# Patient Record
Sex: MALE | Race: WHITE
[De-identification: names, ages, dates, MRNs, and addresses within clinical notes are randomized per-mention and may not be internally consistent; named-entity substitution may affect disease eponyms.]

---

## 2017-04-30 ENCOUNTER — HOSPITAL ENCOUNTER (EMERGENCY)
Dept: HOSPITAL 74 - JERFT | Age: 27
Discharge: HOME | End: 2017-04-30
Payer: COMMERCIAL

## 2017-04-30 VITALS — BODY MASS INDEX: 31.4 KG/M2

## 2017-04-30 VITALS — DIASTOLIC BLOOD PRESSURE: 68 MMHG | SYSTOLIC BLOOD PRESSURE: 133 MMHG | HEART RATE: 80 BPM | TEMPERATURE: 98.4 F

## 2017-04-30 DIAGNOSIS — S61.230A: Primary | ICD-10-CM
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
## 2017-04-30 LAB
HIV 1 & 2 AB: NEGATIVE
HIV 1 AGP24: NEGATIVE

## 2017-04-30 NOTE — PDOC
Post Exposure HPI





- General


Chief Complaint: Non EmpBld/Body Flud Exposure


Stated Complaint: STUCK BY NEEDLE





- History of Present Illness


Initial Comments: 


04/30/17 14:13


CHIEF COMPLAINT:





HISTORY OF PRESENT ILLNESS: 26 yo M with no PMH presents to ED s/p getting 

stuck by a needle in his car approximately 1 hour ago .  Patient states he was 

cleaning out a car that he bought 3 months ago and when he reached under a seat 

he felt a prick and saw that it was a used insulin needle. He denies any fever, 

nausea, vomiting, diarrhea, or other symptoms.  Patient received Tdap in this 

ER 3 years ago. 





No recent travel or sick contacts. 





PAST MEDICAL HISTORY: Denies past medical history





FAMILY HISTORY: Denies





SOCIAL HISTORY:  Denies tobacco, alcohol, illicit drug use. 





SURGICAL HISTORY: Denies





ALLERGIES: No known drug allergies





REVIEW OF SYSTEMS


General/Constitutional: Denies fever or chills. Denies weakness, weight change.





HEENT: Denies change in vision. Denies ear pain or discharge. Denies sore 

throat.





Cardiovascular: Denies chest pain or shortness of breath.





Respiratory: Denies cough, wheezing, or hemoptysis.





Gastrointestinal: Denies nausea, vomiting, diarrhea or constipation. Denies 

rectal bleeding.





Genitourinary: Denies dysuria, frequency, or change in urination.





Musculoskeletal: Denies joint or muscle swelling or pain. Denies neck or back 

pain.





Skin and breasts: "I had a small prick on my finger, it bled like a drop but it'

s fine now." Denies rash or easy bruising.





Neurologic: Denies headache, vertigo, loss of consciousness, or loss of 

sensation.





PHYSICAL EXAM


General Appearance: Well-appearing, appropriately dressed.  No apparent 

distress.





HEENT: EOMI, PERRLA, normal ENT inspection, normal voice, TMs normal, pharynx 

normal.  No conjunctival pallor.  No photophobia, scleral icterus.





Neck: Supple.  Trachea midline. No tenderness, rigidity, carotid bruit, stridor

, lymphadenopathy, or thyromegaly. 





Respiratory/Chest: Lungs CTAB.  





Cardiovascular: RRR. S1, S2. 





Gastrointestinal/Abdominal: Normal bowel sounds.  Abdomen soft, non-distended.  

No tenderness or rebound tenderness. No  organomegaly, pulsatile mass, guarding

, hernia, hepatomegaly, splenomegaly.





Musculoskeletal/Extremities:  Normal inspection. FROM of all extremities, 

normal capillary refill.  Pelvis Stable.  No CVA tenderness. No tenderness to 

extremities, pedal edema, swelling, erythema or deformity.





Integumentary: Appropriate color, dry, warm.  No cyanosis, erythema, jaundice 

or rash





Neurologic: CNs II-XII intact. Fully oriented, alert.  Appropriate mood/affect. 

Motor strength 5/5.  No appreciable EOM palsy, facial droop or sensory deficit.





04/30/17 14:35








04/30/17 14:36








Past History





- Past Medical History


Allergies/Adverse Reactions: 


Allergies





No Known Allergies Allergy (Verified 04/30/17 13:25)


 








Home Medications: 


Ambulatory Orders





NK [No Known Home Medication]  04/30/17 











- Immunization History


Tetanus Status: Unknown





- Social History


Smoking Status: Never smoked


Number of Ciarettes Per Day: 2





*Physical Exam





- Vital Signs


 Last Vital Signs











Temp Pulse Resp BP Pulse Ox


 


 98.4 F   80   18   133/68   100 


 


 04/30/17 13:26  04/30/17 13:26  04/30/17 13:26  04/30/17 13:26  04/30/17 13:26














Medical Decision Making





- Medical Decision Making


04/30/17 14:36


26 yo M with no PMH presents to ED s/p getting stuck by a needle in his car 

approximately 1 hour ago .  





PAtient is UTD with tetanus





Patient states he does not want to start any prophylactic medication but would 

like to get tested for HIV and Hep C. 





Advised patient we will call with any positive results.  Advised patient that 

seroconversion does not happen immediately and follow up testing in 6-8 weeks 

is recommended.  Patient verbalized understanding and states he will f/u with 

his PMD in 6-8 weeks for further testing. 





04/30/17 14:39








*DC/Admit/Observation/Transfer


Diagnosis at time of Disposition: 


 Needle stick injury of finger





- Discharge Dispostion


Disposition: HOME


Condition at time of disposition: Stable


Admit: No





- Referrals


Referrals: 


Yousif Segundo MD [Primary Care Provider] - 





- Patient Instructions


Printed Discharge Instructions:  How to Handle Body Fluid Exposure -- Non-

Healthcare Worker (At Home, Caregi


Additional Instructions: 


As discussed, the risk of infection from a needlestick from a needle that has 

been in your car for 3 months is very low, but there is still a risk.  It is 

advised that you follow up with your primary care doctor for repeat testing.  

If you experience any fever, nausea, vomiting, diarrhea, sore throat, fatigue, 

or any new or worsening symptoms, please return to the ER.

## 2020-01-29 ENCOUNTER — HOSPITAL ENCOUNTER (EMERGENCY)
Dept: HOSPITAL 74 - JER | Age: 30
LOS: 1 days | Discharge: HOME | End: 2020-01-30
Payer: COMMERCIAL

## 2020-01-29 VITALS — SYSTOLIC BLOOD PRESSURE: 124 MMHG | HEART RATE: 110 BPM | DIASTOLIC BLOOD PRESSURE: 74 MMHG

## 2020-01-29 VITALS — BODY MASS INDEX: 25.2 KG/M2

## 2020-01-29 DIAGNOSIS — B97.89: ICD-10-CM

## 2020-01-29 DIAGNOSIS — J45.909: ICD-10-CM

## 2020-01-29 DIAGNOSIS — J06.9: Primary | ICD-10-CM

## 2020-01-29 PROCEDURE — 3E033NZ INTRODUCTION OF ANALGESICS, HYPNOTICS, SEDATIVES INTO PERIPHERAL VEIN, PERCUTANEOUS APPROACH: ICD-10-PCS

## 2020-01-29 NOTE — PDOC
Attending Attestation





- Resident


Resident Name: AkikoSylvie





- ED Attending Attestation


I have performed the following: I have examined & evaluated the patient, The 

case was reviewed & discussed with the resident, I agree w/resident's findings 

& plan





- HPI


HPI: 





01/30/20 00:05


see resident hpi





- Physicial Exam


PE: 





01/30/20 00:06


see resident exam





- Medical Decision Making





01/30/20 00:07


29-year-old male with body aches and cough as well as fevers at home


Plan for basic labs, IV fluids and analgesics due to clinical dehydration on 

exam


Plan for DC home pending results and reevaluation

## 2020-01-30 VITALS — TEMPERATURE: 99.3 F

## 2020-01-30 LAB
ALBUMIN SERPL-MCNC: 4.2 G/DL (ref 3.4–5)
ALP SERPL-CCNC: 60 U/L (ref 45–117)
ALT SERPL-CCNC: 50 U/L (ref 13–61)
ANION GAP SERPL CALC-SCNC: 10 MMOL/L (ref 8–16)
AST SERPL-CCNC: 37 U/L (ref 15–37)
BASOPHILS # BLD: 0.2 % (ref 0–2)
BILIRUB SERPL-MCNC: 0.5 MG/DL (ref 0.2–1)
BUN SERPL-MCNC: 9 MG/DL (ref 7–18)
CALCIUM SERPL-MCNC: 8.6 MG/DL (ref 8.5–10.1)
CHLORIDE SERPL-SCNC: 103 MMOL/L (ref 98–107)
CO2 SERPL-SCNC: 24 MMOL/L (ref 21–32)
CREAT SERPL-MCNC: 0.8 MG/DL (ref 0.55–1.3)
DEPRECATED RDW RBC AUTO: 14.2 % (ref 11.9–15.9)
EOSINOPHIL # BLD: 0.1 % (ref 0–4.5)
GLUCOSE SERPL-MCNC: 137 MG/DL (ref 74–106)
HCT VFR BLD CALC: 43.9 % (ref 35.4–49)
HGB BLD-MCNC: 15 GM/DL (ref 11.7–16.9)
LYMPHOCYTES # BLD: 5.8 % (ref 8–40)
MCH RBC QN AUTO: 29.9 PG (ref 25.7–33.7)
MCHC RBC AUTO-ENTMCNC: 34.1 G/DL (ref 32–35.9)
MCV RBC: 87.9 FL (ref 80–96)
MONOCYTES # BLD AUTO: 6.8 % (ref 3.8–10.2)
NEUTROPHILS # BLD: 87.1 % (ref 42.8–82.8)
PLATELET # BLD AUTO: 178 K/MM3 (ref 134–434)
PMV BLD: 8.4 FL (ref 7.5–11.1)
POTASSIUM SERPLBLD-SCNC: 3.7 MMOL/L (ref 3.5–5.1)
PROT SERPL-MCNC: 7.1 G/DL (ref 6.4–8.2)
RBC # BLD AUTO: 5 M/MM3 (ref 4–5.6)
SODIUM SERPL-SCNC: 137 MMOL/L (ref 136–145)
WBC # BLD AUTO: 8.6 K/MM3 (ref 4–10)

## 2020-02-24 ENCOUNTER — HOSPITAL ENCOUNTER (EMERGENCY)
Dept: HOSPITAL 74 - JER | Age: 30
Discharge: HOME | End: 2020-02-24
Payer: COMMERCIAL

## 2020-02-24 VITALS — BODY MASS INDEX: 21.3 KG/M2

## 2020-02-24 VITALS — SYSTOLIC BLOOD PRESSURE: 127 MMHG | HEART RATE: 74 BPM | DIASTOLIC BLOOD PRESSURE: 65 MMHG | TEMPERATURE: 98.1 F

## 2020-02-24 DIAGNOSIS — R07.9: Primary | ICD-10-CM

## 2020-02-24 LAB
ALBUMIN SERPL-MCNC: 4 G/DL (ref 3.4–5)
ALP SERPL-CCNC: 68 U/L (ref 45–117)
ALT SERPL-CCNC: 24 U/L (ref 13–61)
ANION GAP SERPL CALC-SCNC: 2 MMOL/L (ref 8–16)
APTT BLD: 31.4 SECONDS (ref 25.2–36.5)
AST SERPL-CCNC: 13 U/L (ref 15–37)
BASOPHILS # BLD: 0.6 % (ref 0–2)
BILIRUB SERPL-MCNC: 0.9 MG/DL (ref 0.2–1)
BUN SERPL-MCNC: 11 MG/DL (ref 7–18)
CALCIUM SERPL-MCNC: 9.4 MG/DL (ref 8.5–10.1)
CHLORIDE SERPL-SCNC: 106 MMOL/L (ref 98–107)
CO2 SERPL-SCNC: 29 MMOL/L (ref 21–32)
CREAT SERPL-MCNC: 0.7 MG/DL (ref 0.55–1.3)
DEPRECATED RDW RBC AUTO: 14.6 % (ref 11.9–15.9)
EOSINOPHIL # BLD: 1.2 % (ref 0–4.5)
GLUCOSE SERPL-MCNC: 91 MG/DL (ref 74–106)
HCT VFR BLD CALC: 43 % (ref 35.4–49)
HGB BLD-MCNC: 14.4 GM/DL (ref 11.7–16.9)
INR BLD: 1.13 (ref 0.83–1.09)
LYMPHOCYTES # BLD: 25.8 % (ref 8–40)
MAGNESIUM SERPL-MCNC: 2.3 MG/DL (ref 1.8–2.4)
MCH RBC QN AUTO: 30.1 PG (ref 25.7–33.7)
MCHC RBC AUTO-ENTMCNC: 33.5 G/DL (ref 32–35.9)
MCV RBC: 89.7 FL (ref 80–96)
MONOCYTES # BLD AUTO: 7.7 % (ref 3.8–10.2)
NEUTROPHILS # BLD: 64.7 % (ref 42.8–82.8)
PLATELET # BLD AUTO: 241 K/MM3 (ref 134–434)
PMV BLD: 8.2 FL (ref 7.5–11.1)
POTASSIUM SERPLBLD-SCNC: 4.6 MMOL/L (ref 3.5–5.1)
PROT SERPL-MCNC: 7.4 G/DL (ref 6.4–8.2)
PT PNL PPP: 13.4 SEC (ref 9.7–13)
RBC # BLD AUTO: 4.8 M/MM3 (ref 4–5.6)
SODIUM SERPL-SCNC: 137 MMOL/L (ref 136–145)
WBC # BLD AUTO: 6.4 K/MM3 (ref 4–10)

## 2020-02-24 PROCEDURE — 3E0233Z INTRODUCTION OF ANTI-INFLAMMATORY INTO MUSCLE, PERCUTANEOUS APPROACH: ICD-10-PCS | Performed by: EMERGENCY MEDICINE

## 2020-02-24 NOTE — EKG
Test Reason : 

Blood Pressure : ***/*** mmHG

Vent. Rate : 070 BPM     Atrial Rate : 070 BPM

   P-R Int : 146 ms          QRS Dur : 084 ms

    QT Int : 380 ms       P-R-T Axes : 069 083 059 degrees

   QTc Int : 410 ms

 

NORMAL SINUS RHYTHM

NORMAL ECG

NO PREVIOUS ECGS AVAILABLE

Confirmed by Koby Rodriguez ([id number]) on 2/24/2020 9:10:38 AM

 

Referred By:             Confirmed By:Koby Rodriguez

## 2020-02-24 NOTE — PDOC
History of Present Illness





- General


History Source: Patient


Exam Limitations: No Limitations





- History of Present Illness


Initial Comments: 





02/24/20 07:39


Patient is a 30-year-old male with no past medical history who presents to the 

ED with complaint of sharp midsternal chest pain that is reproducible for the 

last 5 days.  He states that the pain was initially in the anterior axillary 

line and it was intermittent.  Last night the patient took aspirin and went to 

sleep.  He states the aspirin helped his pain.  He woke up this morning and the 

pain was midsternal and constant.  He became concerned and came to the ED for 

evaluation.  He denies any recent long travel in a car or plane.  He denies any 

history of clotting disorders.  The patient is a smoker.











<Alexandra Beauchamp - Last Filed: 02/24/20 10:04>





<Dom Cullen - Last Filed: 02/24/20 14:53>





- General


Chief Complaint: Chest Pain


Stated Complaint: CHEST PAIN


Time Seen by Provider: 02/24/20 07:21





Past History





- Past Medical History


Asthma: Yes


COPD: No





- Psycho Social/Smoking Cessation Hx


Smoking History: Current every day smoker


Have you smoked in the past 12 months: Yes


Number of Cigarettes Smoked Daily: 12


Information on smoking cessation initiated: Yes


Hx Alcohol Use: No


Drug/Substance Use Hx: No


Substance Use Type: Alcohol





<Alexandra Beauchamp - Last Filed: 02/24/20 10:04>





<Dom Cullen - Last Filed: 02/24/20 14:53>





- Past Medical History


Allergies/Adverse Reactions: 


 Allergies











Allergy/AdvReac Type Severity Reaction Status Date / Time


 


No Known Allergies Allergy   Verified 04/30/17 13:25











Home Medications: 


Ambulatory Orders





Ibuprofen [Motrin -] 600 mg PO TID PRN #21 tablet 02/24/20 











**Review of Systems





- Review of Systems


Comments:: 





02/24/20 07:40


- Review of Systems


Able to Perform ROS?: Yes


Constitutional: No: Fever, Chills, Loss of Appetite, Night Sweats, Weakness


HEENTM: No: Eye Pain, Vision changes, Ear Pain, Throat Pain, Throat Swelling, 

Mouth Pain, Difficulty Swallowing


Respiratory: No: Cough, Shortness of Breath, Wheezing, Sputum Production


Cardiac (ROS): No: Chest Tightness, Palpitations, Irregular Heart Beat, Edema; 

positive: Midsternal chest pain


ABD/GI: No: Nausea, Vomiting, Abdominal Pain, Diarrhea


: No Dysuria, No Hematuria, No Frequency, No Urgency


Musculoskeletal: No: Muscle Pain, Back Pain, Joint Pain, Muscle Weakness, Neck 

Pain


Integumentary: No: Lesions, Rash


Neurological: No: Headache, Numbness, Tingling, Weakness, Speech Difficulties





<Alexandra Beauchamp D - Last Filed: 02/24/20 10:04>





*Physical Exam





- Vital Signs


 Last Vital Signs











Temp Pulse Resp BP Pulse Ox


 


 98.1 F   74   16   127/65   97 


 


 02/24/20 07:18  02/24/20 07:18  02/24/20 07:18  02/24/20 07:18  02/24/20 07:18














- Physical Exam





02/24/20 07:41


- Physical Exam


General Appearance:  Nourished, Appropriately Dressed, No Distress


HEENT: EOMI, Normal Voice, No Muffled/Hoarse voice, No Nasal Congestion, No 

Rhinorrhea, Hearing Grossly Normal, moist mucosa


Neck: Supple, No Lymphadenopathy (R), No Lymphadenopathy (L), No Rigidity, No 

Decreased range of motion


Respiratory/Chest: Lungs Clear, Normal Breath Sounds.  No Respiratory Distress, 

No Accessory Muscle Use


Cardiovascular: Regular Rhythm, Regular Rate, S1, S2, reproducible left 

midsternal chest pain to palpation.  No flail chest.


Gastrointestinal/Abdominal: Normal Bowel Sounds, Soft.  Non-tender, No Guarding

, No Rebound, No Rigidity


Musculoskeletal: Normal Inspection.  No Decreased Range of Motion


Extremity: Normal Capillary Refill, Normal Inspection


Integumentary:  Normal Color, Dry.  No Rash


Neurologic: CNs II-XII NML intact, Fully Oriented, Alert, Normal Mood/Affect, 

Normal Response





<Kennedy Beauchampica D - Last Filed: 02/24/20 10:04>





- Vital Signs


 Last Vital Signs











Temp Pulse Resp BP Pulse Ox


 


 98.1 F   74   16   127/65   97 


 


 02/24/20 07:18  02/24/20 07:18  02/24/20 07:18  02/24/20 07:18  02/24/20 07:18














<Dom Cullen - Last Filed: 02/24/20 14:53>





**Heart Score/ECG Review





- History


History: Slightly suspicious





- Electrocardiogram


EKG: Normal





- Age


Age: </= 45





- Risk Factors


Risk Factors Heart Score: Yes Smoking History


Based on the list above the patient has:: 1-2 risk factors





- ECG Intrepretation


Rhythm: Regular Rhythm





- Axis


Axis: Normal





- ECG Impressions


Normal ECG: Yes


Comment:: 





02/24/20 07:43


Normal sinus rhythm at 70 bpm





<Alexandra Beauchamp D - Last Filed: 02/24/20 10:04>





ED Treatment Course





- LABORATORY


CBC & Chemistry Diagram: 


 02/24/20 08:56





 02/24/20 08:56





- RADIOLOGY


Radiology Studies Ordered: 














 Category Date Time Status


 


 CHEST PA & LAT [RAD] Stat Radiology  02/24/20 07:30 Ordered














<Quynh,Alexandra D - Last Filed: 02/24/20 10:04>





- LABORATORY


CBC & Chemistry Diagram: 


 02/24/20 08:56





 02/24/20 08:56





- ADDITIONAL ORDERS


Additional order review: 


 Laboratory  Results











  02/24/20 02/24/20





  08:56 08:56


 


PT with INR   13.40 H


 


INR   1.13 H


 


PTT (Actin FS)   31.4


 


Sodium  137 


 


Potassium  4.6 


 


Chloride  106 


 


Carbon Dioxide  29 


 


Anion Gap  2 L 


 


BUN  11.0 


 


Creatinine  0.7 


 


Est GFR (CKD-EPI)AfAm  144.72 


 


Est GFR (CKD-EPI)NonAf  124.87 


 


Random Glucose  91 


 


Calcium  9.4 


 


Magnesium  2.3 


 


Total Bilirubin  0.9 


 


AST  13 L 


 


ALT  24 


 


Alkaline Phosphatase  68 


 


Creatine Kinase  75 


 


Troponin I  < 0.02 


 


Total Protein  7.4 


 


Albumin  4.0 








 











  02/24/20





  08:56


 


RBC  4.80


 


MCV  89.7


 


MCHC  33.5


 


RDW  14.6


 


MPV  8.2


 


Neutrophils %  64.7  D


 


Lymphocytes %  25.8  D


 


Monocytes %  7.7


 


Eosinophils %  1.2  D


 


Basophils %  0.6














- Medications


Given in the ED: 


ED Medications














Discontinued Medications














Generic Name Dose Route Start Last Admin





  Trade Name Freq  PRN Reason Stop Dose Admin


 


Ketorolac Tromethamine  15 mg  02/24/20 09:50  02/24/20 10:26





  Toradol Injection -  IM  02/24/20 09:51  15 mg





  ONCE ONE   Administration





     





     





     





     














<Dom Cullen - Last Filed: 02/24/20 14:53>





Medical Decision Making





- Medical Decision Making





02/24/20 07:43


Assessment: Patient is a 30-year-old male with sharp midsternal chest pain for 

5 days.





Plan:


-EKG done in triage


-Chest x-ray ordered


-Labs ordered


-Will reassess


02/24/20 10:04


The patient has been made aware that his EKG and labs are all within normal 

limits.  His chest pain can be secondary to chest wall pain or costochondritis.

  The patient has been advised to follow-up with his primary doctor within 1 to 

2 days for repeat evaluation.  He understands and agrees with this treatment 

plan and the patient stable for discharge.





<Alexandra Beauchamp - Last Filed: 02/24/20 10:04>





- Medical Decision Making








02/24/20 14:52





I reviewed the case of the  mid-level practitioner and was available for 

consultation while in the emergency department 








<Dom Cullen - Last Filed: 02/24/20 14:53>





Discharge





- Discharge Information


Problems reviewed: Yes





<Alexandra Beauchamp - Last Filed: 02/24/20 10:04>





<Dom Cullen - Last Filed: 02/24/20 14:53>





- Discharge Information


Clinical Impression/Diagnosis: 


 Atypical chest pain





Condition: Stable


Disposition: HOME





- Additional Discharge Information


Prescriptions: 


Ibuprofen [Motrin -] 600 mg PO TID PRN #21 tablet


 PRN Reason: Pain





- Follow up/Referral


Referrals: 


Micky Almaraz MD [Staff Physician] - 3 days





- Patient Discharge Instructions


Patient Printed Discharge Instructions:  DI for Atypical Chest Pain


Additional Instructions: 


Get plenty of rest and drink plenty of fluids.  Take the ibuprofen as needed 

for chest wall pain.  Be sure to follow-up with your primary doctor within 1 to 

2 days for repeat evaluation.  You may require further cardiac evaluation as an 

outpatient.  A referral to a cardiologist has been given to you.





- Post Discharge Activity


Work/Back to School Note:  Back to Work

## 2023-05-01 ENCOUNTER — OFFICE (OUTPATIENT)
Dept: URBAN - METROPOLITAN AREA CLINIC 29 | Facility: CLINIC | Age: 33
Setting detail: OPHTHALMOLOGY
End: 2023-05-01
Payer: COMMERCIAL

## 2023-05-01 DIAGNOSIS — H35.711: ICD-10-CM

## 2023-05-01 PROCEDURE — 92004 COMPRE OPH EXAM NEW PT 1/>: CPT | Performed by: OPTOMETRIST

## 2023-05-01 PROCEDURE — 92134 CPTRZ OPH DX IMG PST SGM RTA: CPT | Performed by: OPTOMETRIST

## 2023-05-01 ASSESSMENT — CONFRONTATIONAL VISUAL FIELD TEST (CVF)
OS_FINDINGS: FULL
OD_FINDINGS: FULL

## 2023-05-01 ASSESSMENT — REFRACTION_MANIFEST
OD_AXIS: 040
OD_VA1: 20/60-2
OS_SPHERE: -0.75
OD_CYLINDER: +0.50
OS_AXIS: 155
OS_VA1: 20/20
OD_SPHERE: -0.25
OS_CYLINDER: +0.75

## 2023-05-01 ASSESSMENT — REFRACTION_AUTOREFRACTION
OS_CYLINDER: +1.00
OD_AXIS: 40
OS_SPHERE: -0.75
OS_AXIS: 155
OD_CYLINDER: +0.75
OD_SPHERE: +0.25

## 2023-05-01 ASSESSMENT — SPHEQUIV_DERIVED
OS_SPHEQUIV: -0.25
OD_SPHEQUIV: 0
OD_SPHEQUIV: 0.625
OS_SPHEQUIV: -0.375

## 2023-05-01 ASSESSMENT — VISUAL ACUITY
OD_BCVA: 20/30+2
OS_BCVA: 20/70-2

## 2023-05-22 ENCOUNTER — OFFICE (OUTPATIENT)
Dept: URBAN - METROPOLITAN AREA CLINIC 29 | Facility: CLINIC | Age: 33
Setting detail: OPHTHALMOLOGY
End: 2023-05-22
Payer: COMMERCIAL

## 2023-05-22 DIAGNOSIS — H53.19: ICD-10-CM

## 2023-05-22 DIAGNOSIS — H35.711: ICD-10-CM

## 2023-05-22 PROCEDURE — 92250 FUNDUS PHOTOGRAPHY W/I&R: CPT | Performed by: OPHTHALMOLOGY

## 2023-05-22 PROCEDURE — 92014 COMPRE OPH EXAM EST PT 1/>: CPT | Performed by: OPHTHALMOLOGY

## 2023-05-22 ASSESSMENT — SPHEQUIV_DERIVED
OS_SPHEQUIV: -0.375
OD_SPHEQUIV: 0
OD_SPHEQUIV: 0.625
OS_SPHEQUIV: -0.25

## 2023-05-22 ASSESSMENT — REFRACTION_MANIFEST
OS_AXIS: 155
OS_CYLINDER: +0.75
OD_SPHERE: -0.25
OD_AXIS: 040
OD_VA1: 20/60-2
OS_VA1: 20/20
OS_SPHERE: -0.75
OD_CYLINDER: +0.50

## 2023-05-22 ASSESSMENT — REFRACTION_AUTOREFRACTION
OS_AXIS: 155
OD_CYLINDER: +0.75
OD_AXIS: 40
OS_SPHERE: -0.75
OS_CYLINDER: +1.00
OD_SPHERE: +0.25

## 2023-05-22 ASSESSMENT — TONOMETRY
OS_IOP_MMHG: 14
OD_IOP_MMHG: 14

## 2023-05-22 ASSESSMENT — VISUAL ACUITY
OD_BCVA: 20/50-1
OS_BCVA: 20/40-2

## 2023-05-22 ASSESSMENT — CONFRONTATIONAL VISUAL FIELD TEST (CVF)
OS_FINDINGS: FULL
OD_FINDINGS: FULL

## 2023-06-28 ENCOUNTER — OFFICE (OUTPATIENT)
Dept: URBAN - METROPOLITAN AREA CLINIC 29 | Facility: CLINIC | Age: 33
Setting detail: OPHTHALMOLOGY
End: 2023-06-28
Payer: COMMERCIAL

## 2023-06-28 DIAGNOSIS — H35.711: ICD-10-CM

## 2023-06-28 DIAGNOSIS — H53.19: ICD-10-CM

## 2023-06-28 PROCEDURE — 92202 OPSCPY EXTND ON/MAC DRAW: CPT | Performed by: OPHTHALMOLOGY

## 2023-06-28 PROCEDURE — 99213 OFFICE O/P EST LOW 20 MIN: CPT | Performed by: OPHTHALMOLOGY

## 2023-06-28 ASSESSMENT — SPHEQUIV_DERIVED
OS_SPHEQUIV: -0.25
OD_SPHEQUIV: 0.625
OS_SPHEQUIV: -0.375
OD_SPHEQUIV: 0

## 2023-06-28 ASSESSMENT — TONOMETRY
OD_IOP_MMHG: 14
OS_IOP_MMHG: 15

## 2023-06-28 ASSESSMENT — VISUAL ACUITY
OD_BCVA: 20/25-1
OS_BCVA: 20/30-1

## 2023-06-28 ASSESSMENT — REFRACTION_AUTOREFRACTION
OS_SPHERE: -0.75
OD_AXIS: 40
OS_AXIS: 155
OD_CYLINDER: +0.75
OD_SPHERE: +0.25
OS_CYLINDER: +1.00

## 2023-06-28 ASSESSMENT — CONFRONTATIONAL VISUAL FIELD TEST (CVF)
OD_FINDINGS: FULL
OS_FINDINGS: FULL

## 2023-06-28 ASSESSMENT — REFRACTION_MANIFEST
OD_VA1: 20/60-2
OD_SPHERE: -0.25
OS_SPHERE: -0.75
OS_CYLINDER: +0.75
OD_AXIS: 040
OS_VA1: 20/20
OS_AXIS: 155
OD_CYLINDER: +0.50

## 2023-07-24 ENCOUNTER — OFFICE (OUTPATIENT)
Dept: URBAN - METROPOLITAN AREA CLINIC 29 | Facility: CLINIC | Age: 33
Setting detail: OPHTHALMOLOGY
End: 2023-07-24
Payer: COMMERCIAL

## 2023-07-24 DIAGNOSIS — H35.711: ICD-10-CM

## 2023-07-24 DIAGNOSIS — H53.19: ICD-10-CM

## 2023-07-24 PROCEDURE — 92134 CPTRZ OPH DX IMG PST SGM RTA: CPT | Performed by: OPHTHALMOLOGY

## 2023-07-24 PROCEDURE — 99213 OFFICE O/P EST LOW 20 MIN: CPT | Performed by: OPHTHALMOLOGY

## 2023-07-24 ASSESSMENT — REFRACTION_AUTOREFRACTION
OD_SPHERE: +0.25
OD_AXIS: 40
OS_SPHERE: -0.75
OS_AXIS: 155
OS_CYLINDER: +1.00
OD_CYLINDER: +0.75

## 2023-07-24 ASSESSMENT — SPHEQUIV_DERIVED
OD_SPHEQUIV: 0.625
OS_SPHEQUIV: -0.25
OS_SPHEQUIV: -0.375
OD_SPHEQUIV: 0

## 2023-07-24 ASSESSMENT — TONOMETRY
OD_IOP_MMHG: 12
OS_IOP_MMHG: 13

## 2023-07-24 ASSESSMENT — REFRACTION_MANIFEST
OS_AXIS: 155
OS_VA1: 20/20
OD_AXIS: 040
OS_CYLINDER: +0.75
OD_CYLINDER: +0.50
OD_VA1: 20/60-2
OS_SPHERE: -0.75
OD_SPHERE: -0.25

## 2023-07-24 ASSESSMENT — CONFRONTATIONAL VISUAL FIELD TEST (CVF)
OD_FINDINGS: FULL
OS_FINDINGS: FULL

## 2023-07-24 ASSESSMENT — VISUAL ACUITY
OD_BCVA: 20/25-1
OS_BCVA: 20/30-1

## 2023-10-16 ENCOUNTER — OFFICE (OUTPATIENT)
Dept: URBAN - METROPOLITAN AREA CLINIC 29 | Facility: CLINIC | Age: 33
Setting detail: OPHTHALMOLOGY
End: 2023-10-16
Payer: COMMERCIAL

## 2023-10-16 DIAGNOSIS — H53.19: ICD-10-CM

## 2023-10-16 DIAGNOSIS — H52.13: ICD-10-CM

## 2023-10-16 DIAGNOSIS — H35.711: ICD-10-CM

## 2023-10-16 PROCEDURE — 92014 COMPRE OPH EXAM EST PT 1/>: CPT | Performed by: OPHTHALMOLOGY

## 2023-10-16 PROCEDURE — 92015 DETERMINE REFRACTIVE STATE: CPT | Performed by: OPHTHALMOLOGY

## 2023-10-16 PROCEDURE — 92250 FUNDUS PHOTOGRAPHY W/I&R: CPT | Performed by: OPHTHALMOLOGY

## 2023-10-16 ASSESSMENT — REFRACTION_MANIFEST
OD_SPHERE: -0.25
OU_VA: 20/20
OS_CYLINDER: +0.75
OS_CYLINDER: +0.75
OD_VA1: 20/20
OD_VA1: 20/60-2
OD_AXIS: 45
OS_AXIS: 160
OS_SPHERE: -0.75
OD_CYLINDER: +0.75
OD_CYLINDER: +0.50
OS_AXIS: 155
OD_SPHERE: -0.25
OS_SPHERE: -0.75
OS_VA1: 20/20
OD_AXIS: 040
OS_VA1: 20/25

## 2023-10-16 ASSESSMENT — CONFRONTATIONAL VISUAL FIELD TEST (CVF)
OS_FINDINGS: FULL
OD_FINDINGS: FULL

## 2023-10-16 ASSESSMENT — SPHEQUIV_DERIVED
OS_SPHEQUIV: -0.25
OD_SPHEQUIV: 0.125
OD_SPHEQUIV: 0
OD_SPHEQUIV: 0.625
OS_SPHEQUIV: -0.375
OS_SPHEQUIV: -0.375

## 2023-10-16 ASSESSMENT — REFRACTION_AUTOREFRACTION
OS_CYLINDER: +1.00
OD_CYLINDER: +0.75
OS_SPHERE: -0.75
OD_AXIS: 40
OS_AXIS: 155
OD_SPHERE: +0.25

## 2023-10-16 ASSESSMENT — VISUAL ACUITY
OS_BCVA: 20/20-2
OD_BCVA: 20/25

## 2024-04-17 ENCOUNTER — OFFICE (OUTPATIENT)
Dept: URBAN - METROPOLITAN AREA CLINIC 29 | Facility: CLINIC | Age: 34
Setting detail: OPHTHALMOLOGY
End: 2024-04-17
Payer: COMMERCIAL

## 2024-04-17 DIAGNOSIS — H35.711: ICD-10-CM

## 2024-04-17 DIAGNOSIS — H10.45: ICD-10-CM

## 2024-04-17 DIAGNOSIS — H53.19: ICD-10-CM

## 2024-04-17 PROCEDURE — 92014 COMPRE OPH EXAM EST PT 1/>: CPT | Performed by: OPHTHALMOLOGY
